# Patient Record
Sex: FEMALE | Race: WHITE | Employment: OTHER | URBAN - METROPOLITAN AREA
[De-identification: names, ages, dates, MRNs, and addresses within clinical notes are randomized per-mention and may not be internally consistent; named-entity substitution may affect disease eponyms.]

---

## 2020-11-02 PROBLEM — M81.0 OSTEOPOROSIS: Status: ACTIVE | Noted: 2020-11-02

## 2021-03-15 PROBLEM — R56.9 SEIZURE (HCC): Status: ACTIVE | Noted: 2021-03-15

## 2021-03-15 PROBLEM — M19.90 OSTEOARTHRITIS: Status: ACTIVE | Noted: 2021-03-15

## 2021-03-15 PROBLEM — F03.90 DEMENTIA WITHOUT BEHAVIORAL DISTURBANCE (HCC): Status: ACTIVE | Noted: 2021-03-15

## 2021-08-16 ENCOUNTER — TELEPHONE (OUTPATIENT)
Dept: UROLOGY | Age: 81
End: 2021-08-16

## 2021-08-16 NOTE — TELEPHONE ENCOUNTER
Patient made aware of positive urine culture results and to start antibiotics. Patient voiced understanding.

## 2021-08-27 PROBLEM — R42 VERTIGO: Status: ACTIVE | Noted: 2021-08-27

## 2021-08-27 PROBLEM — R53.81 PHYSICAL DECONDITIONING: Status: ACTIVE | Noted: 2021-08-27
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
## 2021-09-02 PROBLEM — R42 DIZZINESS: Status: RESOLVED | Noted: 2021-08-27 | Resolved: 2021-09-02

## 2021-09-22 PROBLEM — M81.0 AGE-RELATED OSTEOPOROSIS WITHOUT CURRENT PATHOLOGICAL FRACTURE: Status: ACTIVE | Noted: 2020-08-26

## 2021-09-22 PROBLEM — F03.90 UNSPECIFIED DEMENTIA WITHOUT BEHAVIORAL DISTURBANCE: Status: ACTIVE | Noted: 2020-08-24

## 2021-09-22 PROBLEM — G40.909 EPILEPSY, UNSPECIFIED, NOT INTRACTABLE, WITHOUT STATUS EPILEPTICUS (HCC): Status: ACTIVE | Noted: 2020-09-11

## 2021-09-22 PROBLEM — N39.41 URGE INCONTINENCE: Status: ACTIVE | Noted: 2020-08-07

## 2021-09-22 PROBLEM — S92.414D: Status: ACTIVE | Noted: 2020-10-15

## 2022-01-13 ENCOUNTER — TELEPHONE (OUTPATIENT)
Dept: UROLOGY | Age: 82
End: 2022-01-13

## 2022-01-13 RX ORDER — SULFAMETHOXAZOLE AND TRIMETHOPRIM 800; 160 MG/1; MG/1
1 TABLET ORAL 2 TIMES DAILY
Qty: 14 TABLET | Refills: 0 | Status: SHIPPED | OUTPATIENT
Start: 2022-01-13 | End: 2022-01-13

## 2022-01-13 RX ORDER — CEPHALEXIN 500 MG/1
500 CAPSULE ORAL 2 TIMES DAILY
Qty: 14 CAPSULE | Refills: 0 | Status: SHIPPED | OUTPATIENT
Start: 2022-01-13 | End: 2022-01-20

## 2022-01-13 NOTE — TELEPHONE ENCOUNTER
Urine culture is positive for infection. We sent in culture specific bactrim. Take this antibiotic until gone. Take this with food and eat yogurt once per day to prevent GI upset. If you develop nausea, vomiting, or fevers call the office or go to the ER. If your urinary symptoms do not improve once completing the antibiotics call our office.

## 2022-01-13 NOTE — TELEPHONE ENCOUNTER
Received a phone call from [address] [place] [address] stating that patient has an allergy to sulfa. Pharmacy could not tell me what the reaction was to Sulfa. Attempted to call patient,  answered and I asked what reaction patient had to the sulfa, dave was not sure and patient would not talk on the phone. Called daughter next to ask what reactions pt had to sulfa and she states she is unaware, all she knows is that her mother will not take anything with Sulfa in it.